# Patient Record
Sex: FEMALE | Race: WHITE
[De-identification: names, ages, dates, MRNs, and addresses within clinical notes are randomized per-mention and may not be internally consistent; named-entity substitution may affect disease eponyms.]

---

## 2020-07-02 ENCOUNTER — HOSPITAL ENCOUNTER (INPATIENT)
Dept: HOSPITAL 53 - M ED | Age: 45
LOS: 2 days | Discharge: HOME | DRG: 263 | End: 2020-07-04
Attending: SURGERY | Admitting: SURGERY
Payer: COMMERCIAL

## 2020-07-02 VITALS — DIASTOLIC BLOOD PRESSURE: 84 MMHG | SYSTOLIC BLOOD PRESSURE: 139 MMHG

## 2020-07-02 VITALS — BODY MASS INDEX: 37.56 KG/M2 | HEIGHT: 65 IN | WEIGHT: 225.47 LBS

## 2020-07-02 VITALS — DIASTOLIC BLOOD PRESSURE: 81 MMHG | SYSTOLIC BLOOD PRESSURE: 132 MMHG

## 2020-07-02 VITALS — SYSTOLIC BLOOD PRESSURE: 138 MMHG | DIASTOLIC BLOOD PRESSURE: 80 MMHG

## 2020-07-02 VITALS — SYSTOLIC BLOOD PRESSURE: 136 MMHG | DIASTOLIC BLOOD PRESSURE: 83 MMHG

## 2020-07-02 DIAGNOSIS — Z11.59: ICD-10-CM

## 2020-07-02 DIAGNOSIS — J45.909: ICD-10-CM

## 2020-07-02 DIAGNOSIS — K81.0: Primary | ICD-10-CM

## 2020-07-02 DIAGNOSIS — Z88.2: ICD-10-CM

## 2020-07-02 DIAGNOSIS — Z88.0: ICD-10-CM

## 2020-07-02 DIAGNOSIS — Z79.899: ICD-10-CM

## 2020-07-02 LAB
ALBUMIN SERPL BCG-MCNC: 3.6 GM/DL (ref 3.2–5.2)
ALT SERPL W P-5'-P-CCNC: 66 U/L (ref 12–78)
AMYLASE SERPL-CCNC: 22 U/L (ref 25–115)
BASOPHILS # BLD AUTO: 0 10^3/UL (ref 0–0.2)
BASOPHILS NFR BLD AUTO: 0.2 % (ref 0–1)
BILIRUB CONJ SERPL-MCNC: 0.6 MG/DL (ref 0–0.2)
BILIRUB SERPL-MCNC: 1.6 MG/DL (ref 0.2–1)
BUN SERPL-MCNC: 9 MG/DL (ref 7–18)
CALCIUM SERPL-MCNC: 8.5 MG/DL (ref 8.5–10.1)
CHLORIDE SERPL-SCNC: 103 MEQ/L (ref 98–107)
CO2 SERPL-SCNC: 27 MEQ/L (ref 21–32)
CREAT SERPL-MCNC: 0.8 MG/DL (ref 0.55–1.3)
EOSINOPHIL # BLD AUTO: 0 10^3/UL (ref 0–0.5)
EOSINOPHIL NFR BLD AUTO: 0 % (ref 0–3)
GFR SERPL CREATININE-BSD FRML MDRD: > 60 ML/MIN/{1.73_M2} (ref 58–?)
GLUCOSE SERPL-MCNC: 123 MG/DL (ref 70–100)
HCT VFR BLD AUTO: 44.4 % (ref 36–47)
HGB BLD-MCNC: 15.1 G/DL (ref 12–15.5)
INR PPP: 1.23
LIPASE SERPL-CCNC: 87 U/L (ref 73–393)
LYMPHOCYTES # BLD AUTO: 0.8 10^3/UL (ref 1.5–5)
LYMPHOCYTES NFR BLD AUTO: 3.8 % (ref 24–44)
MCH RBC QN AUTO: 32 PG (ref 27–33)
MCHC RBC AUTO-ENTMCNC: 34 G/DL (ref 32–36.5)
MCV RBC AUTO: 94.1 FL (ref 80–96)
MONOCYTES # BLD AUTO: 1.6 10^3/UL (ref 0–0.8)
MONOCYTES NFR BLD AUTO: 7.6 % (ref 0–5)
NEUTROPHILS # BLD AUTO: 18.5 10^3/UL (ref 1.5–8.5)
NEUTROPHILS NFR BLD AUTO: 88 % (ref 36–66)
PLATELET # BLD AUTO: 214 10^3/UL (ref 150–450)
POTASSIUM SERPL-SCNC: 3.8 MEQ/L (ref 3.5–5.1)
PROT SERPL-MCNC: 6.8 GM/DL (ref 6.4–8.2)
PROTHROMBIN TIME: 15.2 SECONDS (ref 11.8–14)
RBC # BLD AUTO: 4.72 10^6/UL (ref 4–5.4)
SODIUM SERPL-SCNC: 137 MEQ/L (ref 136–145)
WBC # BLD AUTO: 21 10^3/UL (ref 4–10)

## 2020-07-02 RX ADMIN — SODIUM CHLORIDE SCH MLS/HR: 9 INJECTION, SOLUTION INTRAVENOUS at 17:45

## 2020-07-02 NOTE — REP
Clinical:  Right upper quadrant pain.

 

Technique:  Axial noncontrast images from the lung bases to the pubic symphysis

with coronal and sagittal re-formations.

 

Findings:

The gallbladder is distended and right upper quadrant inflammatory stranding

surrounding the gallbladder, adjacent rayray hepatis, and infrahepatic right upper

quadrant likely represent acute cholecystitis.  Differential diagnosis includes

pancreatitis and duodenitis.

 

Liver, spleen, bilateral adrenal glands and kidneys are normal.  No evidence for

bowel obstruction or free air to suggest perforation.  Pelvis demonstrates normal

bladder and age-appropriate uterus/adnexa.  Trace free fluid identified in the

pelvis.  Small fat containing periumbilical hernia identified.  Abdominal aorta

without aneurysm.  Musculoskeletal structures are intact.

 

Lung bases demonstrate right middle lobe and right lower lobe atelectasis.

 

Impression:

1.  Distended gallbladder and right upper quadrant inflammatory stranding as

noted above.  Findings likely represent acute cholecystitis.  Associated

duodenitis and/or pancreatitis cannot be excluded.

2.  Right basilar atelectasis.

 

 

Electronically Signed by

Karlos Grimm MD 07/02/2020 12:44 P

## 2020-07-03 VITALS — DIASTOLIC BLOOD PRESSURE: 77 MMHG | SYSTOLIC BLOOD PRESSURE: 149 MMHG

## 2020-07-03 VITALS — SYSTOLIC BLOOD PRESSURE: 140 MMHG | DIASTOLIC BLOOD PRESSURE: 78 MMHG

## 2020-07-03 VITALS — DIASTOLIC BLOOD PRESSURE: 60 MMHG | SYSTOLIC BLOOD PRESSURE: 123 MMHG

## 2020-07-03 VITALS — SYSTOLIC BLOOD PRESSURE: 108 MMHG | DIASTOLIC BLOOD PRESSURE: 58 MMHG

## 2020-07-03 VITALS — DIASTOLIC BLOOD PRESSURE: 54 MMHG | SYSTOLIC BLOOD PRESSURE: 100 MMHG

## 2020-07-03 VITALS — DIASTOLIC BLOOD PRESSURE: 72 MMHG | SYSTOLIC BLOOD PRESSURE: 143 MMHG

## 2020-07-03 VITALS — DIASTOLIC BLOOD PRESSURE: 80 MMHG | SYSTOLIC BLOOD PRESSURE: 139 MMHG

## 2020-07-03 VITALS — DIASTOLIC BLOOD PRESSURE: 57 MMHG | SYSTOLIC BLOOD PRESSURE: 103 MMHG

## 2020-07-03 LAB
ALBUMIN SERPL BCG-MCNC: 2.9 GM/DL (ref 3.2–5.2)
ALT SERPL W P-5'-P-CCNC: 117 U/L (ref 12–78)
BASOPHILS # BLD AUTO: 0 10^3/UL (ref 0–0.2)
BASOPHILS NFR BLD AUTO: 0.1 % (ref 0–1)
BILIRUB SERPL-MCNC: 1 MG/DL (ref 0.2–1)
BUN SERPL-MCNC: 9 MG/DL (ref 7–18)
CALCIUM SERPL-MCNC: 8.2 MG/DL (ref 8.5–10.1)
CHLORIDE SERPL-SCNC: 106 MEQ/L (ref 98–107)
CO2 SERPL-SCNC: 27 MEQ/L (ref 21–32)
CREAT SERPL-MCNC: 0.6 MG/DL (ref 0.55–1.3)
EOSINOPHIL # BLD AUTO: 0 10^3/UL (ref 0–0.5)
EOSINOPHIL NFR BLD AUTO: 0 % (ref 0–3)
GFR SERPL CREATININE-BSD FRML MDRD: > 60 ML/MIN/{1.73_M2} (ref 58–?)
GLUCOSE SERPL-MCNC: 107 MG/DL (ref 70–100)
HCT VFR BLD AUTO: 38.6 % (ref 36–47)
HGB BLD-MCNC: 12.9 G/DL (ref 12–15.5)
LYMPHOCYTES # BLD AUTO: 0.7 10^3/UL (ref 1.5–5)
LYMPHOCYTES NFR BLD AUTO: 4.9 % (ref 24–44)
MCH RBC QN AUTO: 32.1 PG (ref 27–33)
MCHC RBC AUTO-ENTMCNC: 33.4 G/DL (ref 32–36.5)
MCV RBC AUTO: 96 FL (ref 80–96)
MONOCYTES # BLD AUTO: 1 10^3/UL (ref 0–0.8)
MONOCYTES NFR BLD AUTO: 6.7 % (ref 0–5)
NEUTROPHILS # BLD AUTO: 13 10^3/UL (ref 1.5–8.5)
NEUTROPHILS NFR BLD AUTO: 87.8 % (ref 36–66)
PLATELET # BLD AUTO: 179 10^3/UL (ref 150–450)
POTASSIUM SERPL-SCNC: 4.3 MEQ/L (ref 3.5–5.1)
PROT SERPL-MCNC: 5.8 GM/DL (ref 6.4–8.2)
RBC # BLD AUTO: 4.02 10^6/UL (ref 4–5.4)
SODIUM SERPL-SCNC: 137 MEQ/L (ref 136–145)
WBC # BLD AUTO: 14.9 10^3/UL (ref 4–10)

## 2020-07-03 PROCEDURE — 0FT44ZZ RESECTION OF GALLBLADDER, PERCUTANEOUS ENDOSCOPIC APPROACH: ICD-10-PCS | Performed by: SURGERY

## 2020-07-03 RX ADMIN — HYDROCODONE BITARTRATE AND ACETAMINOPHEN PRN TAB: 5; 325 TABLET ORAL at 21:45

## 2020-07-03 RX ADMIN — KETOROLAC TROMETHAMINE PRN MG: 30 INJECTION, SOLUTION INTRAMUSCULAR at 09:18

## 2020-07-03 RX ADMIN — SODIUM CHLORIDE, POTASSIUM CHLORIDE, SODIUM LACTATE AND CALCIUM CHLORIDE SCH MLS/HR: 600; 310; 30; 20 INJECTION, SOLUTION INTRAVENOUS at 05:05

## 2020-07-03 RX ADMIN — ACETAMINOPHEN PRN MG: 325 TABLET ORAL at 15:13

## 2020-07-03 RX ADMIN — BUDESONIDE SCH PUFF: 180 AEROSOL, POWDER RESPIRATORY (INHALATION) at 09:00

## 2020-07-03 RX ADMIN — ACETAMINOPHEN PRN MG: 325 TABLET ORAL at 02:00

## 2020-07-03 RX ADMIN — BUDESONIDE SCH PUFF: 180 AEROSOL, POWDER RESPIRATORY (INHALATION) at 22:26

## 2020-07-03 RX ADMIN — KETOROLAC TROMETHAMINE PRN MG: 30 INJECTION, SOLUTION INTRAMUSCULAR at 02:42

## 2020-07-03 RX ADMIN — BUDESONIDE SCH PUFF: 180 AEROSOL, POWDER RESPIRATORY (INHALATION) at 22:54

## 2020-07-03 RX ADMIN — SODIUM CHLORIDE, POTASSIUM CHLORIDE, SODIUM LACTATE AND CALCIUM CHLORIDE SCH MLS/HR: 600; 310; 30; 20 INJECTION, SOLUTION INTRAVENOUS at 05:01

## 2020-07-03 RX ADMIN — IBUPROFEN PRN MG: 600 TABLET, FILM COATED ORAL at 16:18

## 2020-07-03 RX ADMIN — BUDESONIDE SCH PUFF: 180 AEROSOL, POWDER RESPIRATORY (INHALATION) at 20:51

## 2020-07-03 RX ADMIN — SODIUM CHLORIDE SCH MLS/HR: 9 INJECTION, SOLUTION INTRAVENOUS at 18:03

## 2020-07-03 NOTE — IPN
DATE:  07/03/2020

 

HISTORY:

The patient is now postoperative day #1 from a laparoscopic cholecystectomy for

severe acute cholecystitis.  This was accomplished last evening.  She has done

well overnight and has been tolerating clear liquids.  She reports that her

discomfort is significantly improved today.

 

VITAL SIGNS:

Show that she has been afebrile since surgery.  Her pulse is in the 60s to one

reading in the low 90s.  Her blood pressure is good.

 

INTAKE AND OUTPUT:

Shows that she has had 960 mL orally today with a urine output recorded of 750.

Her drain had 15 mL yesterday and has 70 mL recorded so far today.

 

PHYSICAL EXAMINATION:

The patient is alert and appears quite comfortable sitting up in bed.

Heart exam shows a regular rhythm.

The abdomen is nondistended.  She has bowel sounds present.  Her dressings are

dry.  The drain site appears clean and the drain has a minimal amount of lightly

turbid pink fluid in the tubing and bulb.  The abdomen is soft and without any

undue tenderness.

 

LABORATORY STUDIES:

Today, show a white count of 15,000 which is improved from 21 last evening.

Hemoglobin is 13 with a hematocrit of 39 and the platelet count is 179,000.

Differential count shows 88% neutrophils, 5% lymphocytes and 7% monocytes.

Chemistry shows normal electrolytes with a BUN of 9, creatinine 0.6 and a 
glucose

of 107.  Bilirubin is normal at 1.  AST and ALT are both slightly elevated at 87

and 117 and the alkaline phosphatase is normal at 111.

 

IMPRESSION:

The patient is doing very well 1 day postoperative from her laparoscopic

cholecystectomy for severe acute possibly gangrenous cholecystitis.

 

PLAN:

She will remain on the Invanz for now.  I will keep her in the hospital at least

overnight until tomorrow to continue to monitor her and recheck her labs in the

morning.  I will continue her drain for now, though that may be able to be

removed in the morning.  I will advance her to a regular diet and change her

medications largely to oral.  Her IV will be saline locked.

ROQUE

## 2020-07-04 VITALS — DIASTOLIC BLOOD PRESSURE: 58 MMHG | SYSTOLIC BLOOD PRESSURE: 111 MMHG

## 2020-07-04 VITALS — DIASTOLIC BLOOD PRESSURE: 58 MMHG | SYSTOLIC BLOOD PRESSURE: 119 MMHG

## 2020-07-04 VITALS — DIASTOLIC BLOOD PRESSURE: 57 MMHG | SYSTOLIC BLOOD PRESSURE: 110 MMHG

## 2020-07-04 LAB
ALBUMIN SERPL BCG-MCNC: 2.8 GM/DL (ref 3.2–5.2)
ALT SERPL W P-5'-P-CCNC: 137 U/L (ref 12–78)
BASOPHILS # BLD AUTO: 0.1 10^3/UL (ref 0–0.2)
BASOPHILS NFR BLD AUTO: 0.7 % (ref 0–1)
BILIRUB SERPL-MCNC: 0.5 MG/DL (ref 0.2–1)
BUN SERPL-MCNC: 9 MG/DL (ref 7–18)
CALCIUM SERPL-MCNC: 8 MG/DL (ref 8.5–10.1)
CHLORIDE SERPL-SCNC: 108 MEQ/L (ref 98–107)
CO2 SERPL-SCNC: 28 MEQ/L (ref 21–32)
CREAT SERPL-MCNC: 0.64 MG/DL (ref 0.55–1.3)
EOSINOPHIL # BLD AUTO: 0.2 10^3/UL (ref 0–0.5)
EOSINOPHIL NFR BLD AUTO: 2.3 % (ref 0–3)
GFR SERPL CREATININE-BSD FRML MDRD: > 60 ML/MIN/{1.73_M2} (ref 58–?)
GLUCOSE SERPL-MCNC: 82 MG/DL (ref 70–100)
HCT VFR BLD AUTO: 38.5 % (ref 36–47)
HGB BLD-MCNC: 12.7 G/DL (ref 12–15.5)
LYMPHOCYTES # BLD AUTO: 2.2 10^3/UL (ref 1.5–5)
LYMPHOCYTES NFR BLD AUTO: 24.2 % (ref 24–44)
MCH RBC QN AUTO: 32 PG (ref 27–33)
MCHC RBC AUTO-ENTMCNC: 33 G/DL (ref 32–36.5)
MCV RBC AUTO: 97 FL (ref 80–96)
MONOCYTES # BLD AUTO: 0.8 10^3/UL (ref 0–0.8)
MONOCYTES NFR BLD AUTO: 8.8 % (ref 0–5)
NEUTROPHILS # BLD AUTO: 5.7 10^3/UL (ref 1.5–8.5)
NEUTROPHILS NFR BLD AUTO: 63.2 % (ref 36–66)
PLATELET # BLD AUTO: 206 10^3/UL (ref 150–450)
POTASSIUM SERPL-SCNC: 3.7 MEQ/L (ref 3.5–5.1)
PROT SERPL-MCNC: 5.6 GM/DL (ref 6.4–8.2)
RBC # BLD AUTO: 3.97 10^6/UL (ref 4–5.4)
SODIUM SERPL-SCNC: 140 MEQ/L (ref 136–145)
WBC # BLD AUTO: 9 10^3/UL (ref 4–10)

## 2020-07-04 RX ADMIN — BUDESONIDE SCH PUFF: 180 AEROSOL, POWDER RESPIRATORY (INHALATION) at 07:29

## 2020-07-04 RX ADMIN — HYDROCODONE BITARTRATE AND ACETAMINOPHEN PRN TAB: 5; 325 TABLET ORAL at 08:25

## 2020-07-04 RX ADMIN — IBUPROFEN PRN MG: 600 TABLET, FILM COATED ORAL at 08:26

## 2020-07-08 ENCOUNTER — HOSPITAL ENCOUNTER (OUTPATIENT)
Dept: HOSPITAL 53 - M RAD | Age: 45
End: 2020-07-08
Attending: PHYSICIAN ASSISTANT
Payer: COMMERCIAL

## 2020-07-08 DIAGNOSIS — I87.2: Primary | ICD-10-CM

## 2020-07-08 NOTE — REP
BILATERAL LOWER EXTREMITY DUPLEX DOPPLER VENOUS ULTRASOUND WITH EVALUATION FOR

VENOUS REFLUX:

 

Real-time compression and duplex Doppler interrogation of bilateral lower

extremity deep venous systems is performed.

 

Bilaterally, common femoral, superficial femoral, and popliteal veins are fully

compressible with transducer pressure and demonstrate normal spontaneous and

phasic flow without evidence of deep venous thrombosis.

 

Evaluation for venous reflux in the right lower extremity demonstrates an

anterior accessory greater saphenous vein with reflux.  Marked collateral venous

structure communicates with the proximal anterior accessory greater saphenous

vein centrally with no reflux in the mid aspect of that accessory vein.  There is

reflux in the greater saphenous vein at the saphenofemoral junction with a

duration of 2.1 seconds, AP diameter 9 mm.  There is no reflux more peripherally

in the greater saphenous vein, which measures 5 mm.  There is diffuse reflux

throughout the deep vein system.  There is no reflux in the lesser saphenous vein

which measures 3 mm.

 

On the left, there is an anterior accessory greater saphenous vein which

demonstrates reflux proximally.  There is reflux in the greater saphenous vein at

the saphenofemoral junction with duration 7.3 seconds, AP diameter 12 mm, also in

the mid thigh with duration of 10.6 seconds, AP diameter 8 mm and at the knee 3.2

seconds duration AP diameter 4 mm.  There is reflux in the common femoral vein

and superficial femoral vein with no reflux in the popliteal vein. There is no

reflux in the lesser saphenous vein which measures 3 mm.  Large collateral venous

structure  communicates with the distal greater saphenous vein extending

inferiorly to the medial calf.

 

 

Electronically Signed by

Donell Solis MD 07/09/2020 09:27 A

## 2020-07-13 NOTE — RO
DATE OF PROCEDURE:  07/03/2020

 

PREOPERATIVE DIAGNOSIS:  Acute cholecystitis.

 

POSTOPERATIVE DIAGNOSIS:  Marked acute cholecystitis, possible gangrenous

cholecystitis.

 

PROCEDURE PERFORMED:  Laparoscopic cholecystectomy.

 

SURGEON:  Dr. Phong Mccarthy

 

ASSISTANT:

 

ANESTHESIA:  General.

 

INDICATIONS FOR PROCEDURE:  The patient is a 44-year-old woman who presented to

the emergency department on the morning of July 2nd with an approximately 36-hour

history of upper abdominal pain with associated nausea and vomiting.  She

reported pain in the epigastrium and right subcostal area.  She was found to be

markedly tender, particularly in the right subcostal area.  Her white blood cell

count was elevated to approximately 21,000 and a CT scan showed marked

inflammatory changes of the gallbladder.  She is now for an urgent laparoscopic

cholecystectomy.

 

OPERATIVE PROCEDURE:  The patient was brought to the operating room and placed on

the table in a supine position.  She was placed under general endotracheal

anesthesia.  The patient's abdomen was prepped and draped in a sterile fashion.

0.25% Marcaine was infiltrated at the trocar sites as needed.  A short

longitudinal supraumbilical incision was made and deepened through the

subcutaneous tissues to the fascia.  A Veress needle was inserted and after

positive hanging drop test the abdomen was insufflated with carbon dioxide gas.

The fascia was then scored at the midline at the needle insertion site and an 11

mm port was placed over a 5 mm scope and advanced through the abdominal wall

without difficulty.  Initial examination showed a rim of yellow-green exudate

around the fundus of the gallbladder where it was covered by omentum.  The liver

itself appeared normal.  The visualized portions of the stomach and small and

large bowel appeared normal.  The patient was tilted to a reverse Trendelenburg

position and rolled slightly to the left.  A 5 mm port was placed in the left

upper quadrant and two 5 mm ports were placed in the right upper quadrant.

Graspers were inserted.  The omentum was gently peeled away from the gallbladder

where there were some loose inflammatory attachments.  The gallbladder was found

to have a sickly greenish hue with darker spots in several areas with marked

edema noted of the surrounding tissues.  The appearance was suggestive of

gangrenous cholecystitis.  The gallbladder was markedly distended.  An aspirating

needle was inserted and some thick, dark bile was aspirated, though it was not

possible to completely evacuate the gallbladder.  The gallbladder was then

grasped and elevated.  The omentum was peeled away down to the neck of the

gallbladder.  Inspection revealed that there was marked edema surrounding the

gallbladder neck, but also surrounding the region of the pylorus and extending

out on both sides of the duodenal bulb.  The peritoneum at the gallbladder neck

was opened cautiously using the hook cautery.  Dissection proceeded through the

markedly inflamed and edematous tissues.  The cholecystic artery was identified

and this was doubly clipped with hemoclips.  With further dissection, the cystic

duct was identified.  There was marked inflammation involving the duct and with

slow careful dissection this was exposed circumferentially.  The cystic duct was

doubly clipped with hemoclips and divided.  There appeared to be some debris

impacted in the distal end of the cystic duct.  This was loosened and suctioned

from the abdomen.  Dissection proceeded to free the neck of the gallbladder from

the gallbladder bed.  The gallbladder was then dissected free from the

gallbladder bed primarily using the spatula cautery.  The gallbladder was not

perforated in the course of dissection, but there was a small amount of leakage

of bile from the previous aspiration point.  The gallbladder was placed in an

Endopouch.  The right upper quadrant was then copiously irrigated with saline.

The gallbladder bed was inspected and several small bleeding points were

controlled with cautery.  Final inspection revealed no evidence of bleeding or

bile leak.  The patient was returned to a flat position.  The irrigation was

removed from the abdomen as thoroughly as possible.  I elected to place a Davi

drain into the subhepatic space.  This was inserted through the supraumbilical 11

mm port and directed out through the lateral 5 mm port in the right upper

quadrant.  This was placed to course in the subhepatic space across the area of

the gallbladder dissection and then over beneath the medial aspect of the left

lobe of liver.  Final inspection again confirmed no bleeding or bile leakage.

The abdomen was deflated and the trocars were all removed.  The drain was sutured

to the skin with a #2-0 silk and connected to a Willis-Cheatham bulb.  The

gallbladder was recovered through the supraumbilical site.  It was necessary to

extend the fascial incision an additional centimeter.  There were several small

stones palpable within the gallbladder and these was sent for permanent

pathology.  The fascia at the supraumbilical site was closed with interrupted

simple sutures of #2-0 Vicryl.  The skin incisions were all closed with buried

#4-0 Vicryl and Steri-Strips.  A chlorhexidine gluconate OpSite was placed in the

drain site.  The other incisions were dressed with 2x2s and tape.  The patient

tolerated procedure well without apparent complication.  She was awakened in the

operating room, extubated and moved to the recovery room in stable condition.

## 2020-09-13 ENCOUNTER — HOSPITAL ENCOUNTER (OUTPATIENT)
Dept: HOSPITAL 53 - M LABSMTC | Age: 45
End: 2020-09-13
Attending: ANESTHESIOLOGY
Payer: COMMERCIAL

## 2020-09-13 DIAGNOSIS — Z20.828: ICD-10-CM

## 2020-09-13 DIAGNOSIS — Z01.818: Primary | ICD-10-CM

## 2020-09-18 ENCOUNTER — HOSPITAL ENCOUNTER (OUTPATIENT)
Dept: HOSPITAL 53 - M SDC | Age: 45
Discharge: HOME | End: 2020-09-18
Attending: SURGERY
Payer: COMMERCIAL

## 2020-09-18 VITALS — DIASTOLIC BLOOD PRESSURE: 82 MMHG | SYSTOLIC BLOOD PRESSURE: 135 MMHG

## 2020-09-18 VITALS — BODY MASS INDEX: 36.49 KG/M2 | WEIGHT: 219 LBS | HEIGHT: 65 IN

## 2020-09-18 DIAGNOSIS — J45.909: ICD-10-CM

## 2020-09-18 DIAGNOSIS — Z97.5: ICD-10-CM

## 2020-09-18 DIAGNOSIS — Z91.09: ICD-10-CM

## 2020-09-18 DIAGNOSIS — G43.909: ICD-10-CM

## 2020-09-18 DIAGNOSIS — Z98.51: ICD-10-CM

## 2020-09-18 DIAGNOSIS — Z88.0: ICD-10-CM

## 2020-09-18 DIAGNOSIS — I83.813: ICD-10-CM

## 2020-09-18 DIAGNOSIS — R22.42: ICD-10-CM

## 2020-09-18 DIAGNOSIS — Z88.2: ICD-10-CM

## 2020-09-18 DIAGNOSIS — E66.9: ICD-10-CM

## 2020-09-18 DIAGNOSIS — I87.2: Primary | ICD-10-CM

## 2020-09-18 PROCEDURE — 36475 ENDOVENOUS RF 1ST VEIN: CPT

## 2020-09-18 NOTE — ROOPDOC
[facility] Report Of Operation


Report of Operation


DATE OF PROCEDURE: 9/18/20





PREPROCEDURE DIAGNOSES: Venous insufficiency left lower extremity, symptomatic





POSTPROCEDURE DIAGNOSES: same





PROCEDURE: 


1. US guided access left greater saphenous vein


2. Radiofrequency ablation left greater saphenous vein 





SURGEON: Washington Abdalla MD





ANESTHESIA: Monitored anesthesia care and local





INDICATION FOR PROCEDURE: This is a very pleasant 45-year-old patient with 

chronic venous insufficiency, left lower extremity worse than right, with a 

history of recurrent cellulitis in the left lower extremity below the knee in 

the distribution of the greater saphenous vein. She also has varicosities, pain 

and aching at the end of a long day, and swelling in the left lower extremity 

despite compression stockings and elevation. Risks benefits and alternatives to 

radiofrequency ablation of the left greater saphenous vein were explained to the

patient she is agreeable to proceed. Informed consent was obtained.





REPORT OF OPERATION: Patient was brought to the OR in stable condition. 

Monitored anesthesia care and antibiotics were administered without 

complication. Her left lower extremity was prepped and draped in a sterile 

fashion. A timeout was performed. Ultrasound was used to examine the greater 

saphenous vein, and visualized that it was intact from the saphenofemoral 

junction to below the knee. We then selected and area near the knee and local 

anesthesia was administered to the skin and subcutaneous tissue. Ultrasound was 

used to guide access to the greater saphenous vein with a microneedle. A wire 

was passed through this access and the needle was removed. A 7 Canadian glide 

sheath was placed and flushed with saline. We then inserted a radiofrequency 

catheter up to the saphenofemoral junction. Ultrasound was used to position the 

catheter 2 cm from the saphenofemoral junction. Ultrasound was used to help 

guide tumescent injection around the vein along the length of the catheter. We 

then performed radiofrequency ablation along the length of the vessel, under 

ultrasound guidance with compression. A total of 7 cycles were performed. Ult

rasound was used to confirm that there was successful ablation of the greater 

saphenous vein and no thrombus extending into the saphenofemoral junction. 

Following this, the sheath was removed and pressure was held at the access site 

for 3 minutes for good hemostasis. Steri-Strips were placed over the tumescent 

sites and at the access site. 4 x 4's, Kerlix, and Ace wraps were used to 

wrapped the leg from the foot to the groin. The patient was allowed to awaken 

from her anesthesia was taken to recovery in stable condition. She tolerated the

procedure and the sedation well.





ESTIMATED BLOOD LOSS: Approximately 2 mL. 





COMPLICATIONS: None. 





PLAN: We plan to see the patient back early next week for a repeat ultrasound to

ensure we had a successful ablation, and make sure there is no thrombus 

extending into the saphenofemoral junction. The patient can remove her Ace wrap 

and bandages tomorrow, and take a shower, and then we encouraged her to rewrap 

her leg with the Ace wrap for over the next 2 days to help with compression. It 

is okay to ambulate, but no strenuous exercise over the weekend. Elevate the 

left lower extremity is much as possible above the level of the heart to reduce 

swelling and discomfort. Okay for ice to the medial thigh if desired. 

Prescription for Percocet was given. We appreciate the opportunity to 

participate in the care of this patient.











WASHINGTON ABDALLA MD         Sep 18, 2020 15:21

## 2020-09-21 ENCOUNTER — HOSPITAL ENCOUNTER (OUTPATIENT)
Dept: HOSPITAL 53 - M RAD | Age: 45
End: 2020-09-21
Attending: PHYSICIAN ASSISTANT
Payer: COMMERCIAL

## 2020-09-21 DIAGNOSIS — I87.2: Primary | ICD-10-CM

## 2020-10-02 NOTE — REP
LOWER EXTREMITY VENOUS ULTRASOUND: SONOGRAPHIC GUIDANCE 



HISTORY: Chronic venous insufficiency. 



FINDINGS: 

Sonographic guidance is provided to Dr. Abdalla for radiofrequency ablation 
therapy.  

[facility]

## 2020-10-02 NOTE — REP
LEFT LOWER EXTREMITY DOPPLER ULTRASOUND:  



CLINICAL: Status post ablation with pain.



TECHNIQUE: Real time, gray scale and color evaluation using linear high 
frequency transducer. 



FINDINGS:

Ultrasound examination of the left lower extremity deep venous structures from 
the common femoral vein to the popliteal vein demonstrates normal 
compressibility, flow and wave patterns in response to respiration and 
augmentation. There is no evidence for deep venous thrombosis. 



Patient is again noted to be status post ablation and the greater saphenous vein
is thrombosed 4 mm from the junction proximally to the level of the knee. 



IMPRESSION: 

No evidence for deep venous thrombosis. 













 

[facility]

## 2021-11-05 ENCOUNTER — HOSPITAL ENCOUNTER (OUTPATIENT)
Dept: HOSPITAL 53 - M SFHCADAM | Age: 46
End: 2021-11-05
Attending: FAMILY MEDICINE
Payer: COMMERCIAL

## 2021-11-05 DIAGNOSIS — Z00.00: Primary | ICD-10-CM

## 2021-11-05 LAB
ALBUMIN SERPL BCG-MCNC: 3.9 GM/DL (ref 3.2–5.2)
ALT SERPL W P-5'-P-CCNC: 42 U/L (ref 12–78)
BASOPHILS # BLD AUTO: 0.1 10^3/UL (ref 0–0.2)
BASOPHILS NFR BLD AUTO: 1 % (ref 0–1)
BILIRUB SERPL-MCNC: 0.8 MG/DL (ref 0.2–1)
BUN SERPL-MCNC: 12 MG/DL (ref 7–18)
CALCIUM SERPL-MCNC: 9.6 MG/DL (ref 8.5–10.1)
CHLORIDE SERPL-SCNC: 107 MEQ/L (ref 98–107)
CHOLEST SERPL-MCNC: 190 MG/DL (ref ?–200)
CHOLEST/HDLC SERPL: 2.97 {RATIO} (ref ?–5)
CO2 SERPL-SCNC: 29 MEQ/L (ref 21–32)
CREAT SERPL-MCNC: 0.78 MG/DL (ref 0.55–1.3)
EOSINOPHIL # BLD AUTO: 0.3 10^3/UL (ref 0–0.5)
EOSINOPHIL NFR BLD AUTO: 4.8 % (ref 0–3)
GFR SERPL CREATININE-BSD FRML MDRD: > 60 ML/MIN/{1.73_M2} (ref 58–?)
GLUCOSE SERPL-MCNC: 92 MG/DL (ref 70–100)
HCT VFR BLD AUTO: 43.8 % (ref 36–47)
HDLC SERPL-MCNC: 64 MG/DL (ref 40–?)
HGB BLD-MCNC: 14.3 G/DL (ref 12–15.5)
LDLC SERPL CALC-MCNC: 112 MG/DL (ref ?–100)
LYMPHOCYTES # BLD AUTO: 1.5 10^3/UL (ref 1.5–5)
LYMPHOCYTES NFR BLD AUTO: 26.1 % (ref 24–44)
MCH RBC QN AUTO: 31.4 PG (ref 27–33)
MCHC RBC AUTO-ENTMCNC: 32.6 G/DL (ref 32–36.5)
MCV RBC AUTO: 96.1 FL (ref 80–96)
MONOCYTES # BLD AUTO: 0.5 10^3/UL (ref 0–0.8)
MONOCYTES NFR BLD AUTO: 9.2 % (ref 2–8)
NEUTROPHILS # BLD AUTO: 3.4 10^3/UL (ref 1.5–8.5)
NEUTROPHILS NFR BLD AUTO: 58.6 % (ref 36–66)
NONHDLC SERPL-MCNC: 126 MG/DL
PLATELET # BLD AUTO: 227 10^3/UL (ref 150–450)
POTASSIUM SERPL-SCNC: 4.7 MEQ/L (ref 3.5–5.1)
PROT SERPL-MCNC: 6.9 GM/DL (ref 6.4–8.2)
RBC # BLD AUTO: 4.56 10^6/UL (ref 4–5.4)
SODIUM SERPL-SCNC: 141 MEQ/L (ref 136–145)
TRIGL SERPL-MCNC: 70 MG/DL (ref ?–150)
WBC # BLD AUTO: 5.9 10^3/UL (ref 4–10)

## 2022-02-05 ENCOUNTER — HOSPITAL ENCOUNTER (OUTPATIENT)
Dept: HOSPITAL 53 - M LABSMTC | Age: 47
End: 2022-02-05
Attending: ANESTHESIOLOGY
Payer: COMMERCIAL

## 2022-02-05 DIAGNOSIS — Z11.52: ICD-10-CM

## 2022-02-05 DIAGNOSIS — Z01.812: Primary | ICD-10-CM

## 2022-02-10 ENCOUNTER — HOSPITAL ENCOUNTER (OUTPATIENT)
Dept: HOSPITAL 53 - M OPP | Age: 47
Discharge: HOME | End: 2022-02-10
Attending: SURGERY
Payer: COMMERCIAL

## 2022-02-10 VITALS — HEIGHT: 65 IN | WEIGHT: 221 LBS | BODY MASS INDEX: 36.82 KG/M2

## 2022-02-10 VITALS — DIASTOLIC BLOOD PRESSURE: 89 MMHG | SYSTOLIC BLOOD PRESSURE: 139 MMHG

## 2022-02-10 DIAGNOSIS — Z97.5: ICD-10-CM

## 2022-02-10 DIAGNOSIS — Z79.899: ICD-10-CM

## 2022-02-10 DIAGNOSIS — Z88.0: ICD-10-CM

## 2022-02-10 DIAGNOSIS — Z88.2: ICD-10-CM

## 2022-02-10 DIAGNOSIS — K57.30: ICD-10-CM

## 2022-02-10 DIAGNOSIS — Z12.11: Primary | ICD-10-CM

## 2022-03-15 ENCOUNTER — HOSPITAL ENCOUNTER (OUTPATIENT)
Dept: HOSPITAL 53 - M SFHCADAM | Age: 47
End: 2022-03-15
Attending: PHYSICIAN ASSISTANT
Payer: COMMERCIAL

## 2022-03-15 DIAGNOSIS — R05.9: Primary | ICD-10-CM

## 2022-03-15 PROCEDURE — 87633 RESP VIRUS 12-25 TARGETS: CPT
